# Patient Record
Sex: MALE | Race: BLACK OR AFRICAN AMERICAN | NOT HISPANIC OR LATINO | URBAN - METROPOLITAN AREA
[De-identification: names, ages, dates, MRNs, and addresses within clinical notes are randomized per-mention and may not be internally consistent; named-entity substitution may affect disease eponyms.]

---

## 2019-09-06 ENCOUNTER — EMERGENCY (EMERGENCY)
Facility: HOSPITAL | Age: 49
LOS: 1 days | Discharge: HOME | End: 2019-09-06
Attending: EMERGENCY MEDICINE | Admitting: EMERGENCY MEDICINE
Payer: COMMERCIAL

## 2019-09-06 VITALS
RESPIRATION RATE: 16 BRPM | SYSTOLIC BLOOD PRESSURE: 143 MMHG | OXYGEN SATURATION: 100 % | DIASTOLIC BLOOD PRESSURE: 84 MMHG | HEART RATE: 87 BPM

## 2019-09-06 VITALS
WEIGHT: 145.06 LBS | DIASTOLIC BLOOD PRESSURE: 100 MMHG | HEART RATE: 96 BPM | TEMPERATURE: 99 F | SYSTOLIC BLOOD PRESSURE: 168 MMHG | OXYGEN SATURATION: 100 % | RESPIRATION RATE: 16 BRPM

## 2019-09-06 LAB
ALBUMIN SERPL ELPH-MCNC: 4.1 G/DL — SIGNIFICANT CHANGE UP (ref 3.3–5)
ALP SERPL-CCNC: 71 U/L — SIGNIFICANT CHANGE UP (ref 40–120)
ALT FLD-CCNC: 32 U/L — SIGNIFICANT CHANGE UP (ref 12–78)
ANION GAP SERPL CALC-SCNC: 11 MMOL/L — SIGNIFICANT CHANGE UP (ref 5–17)
APPEARANCE UR: CLEAR — SIGNIFICANT CHANGE UP
AST SERPL-CCNC: 35 U/L — SIGNIFICANT CHANGE UP (ref 15–37)
BASOPHILS # BLD AUTO: 0.03 K/UL — SIGNIFICANT CHANGE UP (ref 0–0.2)
BASOPHILS NFR BLD AUTO: 0.7 % — SIGNIFICANT CHANGE UP (ref 0–2)
BILIRUB SERPL-MCNC: 0.4 MG/DL — SIGNIFICANT CHANGE UP (ref 0.2–1.2)
BILIRUB UR-MCNC: NEGATIVE — SIGNIFICANT CHANGE UP
BUN SERPL-MCNC: 12 MG/DL — SIGNIFICANT CHANGE UP (ref 7–23)
CALCIUM SERPL-MCNC: 9.2 MG/DL — SIGNIFICANT CHANGE UP (ref 8.5–10.1)
CHLORIDE SERPL-SCNC: 98 MMOL/L — SIGNIFICANT CHANGE UP (ref 96–108)
CK MB CFR SERPL CALC: 2.2 NG/ML — SIGNIFICANT CHANGE UP (ref 0–3.6)
CO2 SERPL-SCNC: 26 MMOL/L — SIGNIFICANT CHANGE UP (ref 22–31)
COLOR SPEC: SIGNIFICANT CHANGE UP
CREAT SERPL-MCNC: 1.1 MG/DL — SIGNIFICANT CHANGE UP (ref 0.5–1.3)
DIFF PNL FLD: NEGATIVE — SIGNIFICANT CHANGE UP
EOSINOPHIL # BLD AUTO: 0.08 K/UL — SIGNIFICANT CHANGE UP (ref 0–0.5)
EOSINOPHIL NFR BLD AUTO: 1.8 % — SIGNIFICANT CHANGE UP (ref 0–6)
GLUCOSE SERPL-MCNC: 84 MG/DL — SIGNIFICANT CHANGE UP (ref 70–99)
GLUCOSE UR QL: NEGATIVE — SIGNIFICANT CHANGE UP
HCT VFR BLD CALC: 37.2 % — LOW (ref 39–50)
HGB BLD-MCNC: 12 G/DL — LOW (ref 13–17)
IMM GRANULOCYTES NFR BLD AUTO: 0.2 % — SIGNIFICANT CHANGE UP (ref 0–1.5)
KETONES UR-MCNC: ABNORMAL
LEUKOCYTE ESTERASE UR-ACNC: NEGATIVE — SIGNIFICANT CHANGE UP
LYMPHOCYTES # BLD AUTO: 1.62 K/UL — SIGNIFICANT CHANGE UP (ref 1–3.3)
LYMPHOCYTES # BLD AUTO: 35.7 % — SIGNIFICANT CHANGE UP (ref 13–44)
MCHC RBC-ENTMCNC: 26.9 PG — LOW (ref 27–34)
MCHC RBC-ENTMCNC: 32.3 GM/DL — SIGNIFICANT CHANGE UP (ref 32–36)
MCV RBC AUTO: 83.4 FL — SIGNIFICANT CHANGE UP (ref 80–100)
MONOCYTES # BLD AUTO: 0.39 K/UL — SIGNIFICANT CHANGE UP (ref 0–0.9)
MONOCYTES NFR BLD AUTO: 8.6 % — SIGNIFICANT CHANGE UP (ref 2–14)
NEUTROPHILS # BLD AUTO: 2.41 K/UL — SIGNIFICANT CHANGE UP (ref 1.8–7.4)
NEUTROPHILS NFR BLD AUTO: 53 % — SIGNIFICANT CHANGE UP (ref 43–77)
NITRITE UR-MCNC: NEGATIVE — SIGNIFICANT CHANGE UP
NRBC # BLD: 0 /100 WBCS — SIGNIFICANT CHANGE UP (ref 0–0)
PH UR: 7 — SIGNIFICANT CHANGE UP (ref 5–8)
PLATELET # BLD AUTO: 216 K/UL — SIGNIFICANT CHANGE UP (ref 150–400)
POTASSIUM SERPL-MCNC: 3.7 MMOL/L — SIGNIFICANT CHANGE UP (ref 3.5–5.3)
POTASSIUM SERPL-SCNC: 3.7 MMOL/L — SIGNIFICANT CHANGE UP (ref 3.5–5.3)
PROT SERPL-MCNC: 7.7 G/DL — SIGNIFICANT CHANGE UP (ref 6–8.3)
PROT UR-MCNC: NEGATIVE — SIGNIFICANT CHANGE UP
RBC # BLD: 4.46 M/UL — SIGNIFICANT CHANGE UP (ref 4.2–5.8)
RBC # FLD: 13.2 % — SIGNIFICANT CHANGE UP (ref 10.3–14.5)
SODIUM SERPL-SCNC: 135 MMOL/L — SIGNIFICANT CHANGE UP (ref 135–145)
SP GR SPEC: 1 — LOW (ref 1.01–1.02)
TROPONIN I SERPL-MCNC: <.015 NG/ML — SIGNIFICANT CHANGE UP (ref 0.01–0.04)
UROBILINOGEN FLD QL: NEGATIVE — SIGNIFICANT CHANGE UP
WBC # BLD: 4.54 K/UL — SIGNIFICANT CHANGE UP (ref 3.8–10.5)
WBC # FLD AUTO: 4.54 K/UL — SIGNIFICANT CHANGE UP (ref 3.8–10.5)

## 2019-09-06 PROCEDURE — 80053 COMPREHEN METABOLIC PANEL: CPT

## 2019-09-06 PROCEDURE — 85027 COMPLETE CBC AUTOMATED: CPT

## 2019-09-06 PROCEDURE — 82553 CREATINE MB FRACTION: CPT

## 2019-09-06 PROCEDURE — 81003 URINALYSIS AUTO W/O SCOPE: CPT

## 2019-09-06 PROCEDURE — 99284 EMERGENCY DEPT VISIT MOD MDM: CPT | Mod: 25

## 2019-09-06 PROCEDURE — 96360 HYDRATION IV INFUSION INIT: CPT

## 2019-09-06 PROCEDURE — 71046 X-RAY EXAM CHEST 2 VIEWS: CPT

## 2019-09-06 PROCEDURE — 99284 EMERGENCY DEPT VISIT MOD MDM: CPT

## 2019-09-06 PROCEDURE — 84484 ASSAY OF TROPONIN QUANT: CPT

## 2019-09-06 PROCEDURE — 93005 ELECTROCARDIOGRAM TRACING: CPT

## 2019-09-06 PROCEDURE — 36415 COLL VENOUS BLD VENIPUNCTURE: CPT

## 2019-09-06 PROCEDURE — 71046 X-RAY EXAM CHEST 2 VIEWS: CPT | Mod: 26

## 2019-09-06 PROCEDURE — 93010 ELECTROCARDIOGRAM REPORT: CPT

## 2019-09-06 RX ORDER — NIFEDIPINE 30 MG
60 TABLET, EXTENDED RELEASE 24 HR ORAL ONCE
Refills: 0 | Status: COMPLETED | OUTPATIENT
Start: 2019-09-06 | End: 2019-09-06

## 2019-09-06 RX ORDER — HYDROCHLOROTHIAZIDE 25 MG
25 TABLET ORAL ONCE
Refills: 0 | Status: COMPLETED | OUTPATIENT
Start: 2019-09-06 | End: 2019-09-06

## 2019-09-06 RX ORDER — SODIUM CHLORIDE 9 MG/ML
1000 INJECTION INTRAMUSCULAR; INTRAVENOUS; SUBCUTANEOUS ONCE
Refills: 0 | Status: COMPLETED | OUTPATIENT
Start: 2019-09-06 | End: 2019-09-06

## 2019-09-06 RX ADMIN — SODIUM CHLORIDE 1000 MILLILITER(S): 9 INJECTION INTRAMUSCULAR; INTRAVENOUS; SUBCUTANEOUS at 15:15

## 2019-09-06 RX ADMIN — Medication 25 MILLIGRAM(S): at 15:19

## 2019-09-06 RX ADMIN — SODIUM CHLORIDE 1000 MILLILITER(S): 9 INJECTION INTRAMUSCULAR; INTRAVENOUS; SUBCUTANEOUS at 16:15

## 2019-09-06 RX ADMIN — Medication 60 MILLIGRAM(S): at 15:19

## 2019-09-06 NOTE — ED PROVIDER NOTE - PROGRESS NOTE DETAILS
patient feeling better, able to ambulate normal gait, blood pressure improved, understands to be compliant with meds, will f/u with own PMD next week

## 2019-09-06 NOTE — ED PROVIDER NOTE - CLINICAL SUMMARY MEDICAL DECISION MAKING FREE TEXT BOX
chills, trembling, elevated blood pressure, did not take his meds, f/u labs, ekg, chest xray, give anti-hypertensive, re-eval

## 2019-09-06 NOTE — ED PROVIDER NOTE - OBJECTIVE STATEMENT
49 male presents to ER states he was driving to work today and felt chills, tembling of bilateral hand and legs, denies chest pain. Patient states he forgot to take his blood pressure medications this morning (on nifedinpine, hctz, atenolol).

## 2019-09-06 NOTE — ED ADULT NURSE NOTE - OBJECTIVE STATEMENT
received pt in bed #18a Pt A&O states he was driving & started trembling received pt in bed #18a Pt A&O states he was driving & started trembling & states his arms & legs are weak

## 2019-09-06 NOTE — ED PROVIDER NOTE - PATIENT PORTAL LINK FT
You can access the FollowMyHealth Patient Portal offered by [facility] by registering at the following website: [email or web address]. By joining [facility]’s FollowMyHealth portal, you will also be able to view your health information using other applications (apps) compatible with our system.

## 2019-09-06 NOTE — ED ADULT NURSE NOTE - TEMPLATE LIST FOR HEAD TO TOE ASSESSMENT
"Subjective:       Patient ID: Fariba Polk is a 37 y.o. female.    Vitals:  height is 5' 4" (1.626 m) and weight is 61.7 kg (136 lb). Her temperature is 98 °F (36.7 °C). Her blood pressure is 114/64 and her pulse is 79. Her respiration is 18 and oxygen saturation is 98%.     Chief Complaint: Mass    Pt states she noticed an area of swelling to left lower ribs/left upper abdominal region a couple weeks ago that has now enlarged, become more painful and pt reports losing weight. She has been working extra hours "a lot" at work and "ignoring it but now its bigger and more painful".  Pt states she was hit by a large pt in this area while working a few weeks ago but did not notice anything particular at that time. Hx choly earlier this year. Pt has dropped 40 pounds since Jan 2019, 6 pounds have been since noticing this left abdominal area of pain.    Mass   This is a new problem. The current episode started 1 to 4 weeks ago. The problem has been gradually worsening. Associated symptoms include abdominal pain (caused by a mass ). Pertinent negatives include no arthralgias, change in bowel habit, chest pain, chills, diaphoresis, fatigue, fever, headaches, joint swelling, myalgias, nausea, neck pain, numbness, rash, sore throat, swollen glands, urinary symptoms, vomiting or weakness. Exacerbated by: touching it. She has tried position changes for the symptoms. The treatment provided no relief.       Constitution: Positive for unexpected weight change. Negative for appetite change, chills, sweating, fatigue and fever.   HENT: Negative for sore throat and trouble swallowing.    Neck: Negative for neck pain, neck stiffness and painful lymph nodes.   Cardiovascular: Negative for chest pain and leg swelling.   Respiratory: Negative for shortness of breath.    Gastrointestinal: Positive for abdominal pain (caused by a mass ). Negative for abdominal trauma, abdominal bloating, history of abdominal surgery, nausea, vomiting, " constipation, diarrhea, bright red blood in stool, dark colored stools and heartburn.   Genitourinary: Negative for dysuria, frequency, urgency, flank pain, hematuria, history of kidney stones, missed menses and pelvic pain.   Musculoskeletal: Negative for joint pain, joint swelling, back pain and muscle ache.   Skin: Negative for pale, rash, abrasion and erythema.   Neurological: Negative for headaches, numbness and tingling.   Hematologic/Lymphatic: Negative for swollen lymph nodes and easy bruising/bleeding. Does not bruise/bleed easily.       Objective:      Physical Exam   Constitutional: She is oriented to person, place, and time. She appears well-developed and well-nourished. She is cooperative. No distress.   HENT:   Head: Normocephalic and atraumatic.   Right Ear: External ear normal.   Left Ear: External ear normal.   Nose: Nose normal.   Mouth/Throat: Mucous membranes are normal.   Eyes: Conjunctivae and lids are normal. No scleral icterus.   Neck: Trachea normal, normal range of motion and full passive range of motion without pain. Neck supple.   Cardiovascular: Normal rate, regular rhythm, normal heart sounds and intact distal pulses.   No murmur heard.  Pulmonary/Chest: Effort normal and breath sounds normal. No respiratory distress. She has no wheezes. She exhibits no tenderness.   Abdominal: Soft. Normal appearance and bowel sounds are normal. She exhibits mass. She exhibits no distension, no abdominal bruit and no pulsatile midline mass. There is tenderness in the left upper quadrant. There is no rigidity, no rebound, no guarding, no CVA tenderness, no tenderness at McBurney's point and negative Mckeon's sign.       Musculoskeletal: Normal range of motion. She exhibits no edema.   Neurological: She is alert and oriented to person, place, and time. She has normal strength. Coordination normal.   Skin: Skin is warm, dry and intact. Capillary refill takes less than 2 seconds. No rash noted. She is not  diaphoretic. No erythema. No pallor.   Psychiatric: She has a normal mood and affect. Her speech is normal and behavior is normal. Judgment and thought content normal. Cognition and memory are normal.   Nursing note and vitals reviewed.      Assessment:       1. Abdominal pain, LUQ (left upper quadrant)        Plan:     Pt with luq pain, 2-3 cm soft mass felt to left lower rib line upper left abdominal area. No distension. No guarding. Normal BS. Advised pt on f/u at ED, states she will go after work tonight when gets off at 1115. Explained to pt I would give her a work excuse and this should be completed as soon as possible but states she needs to go to work. Discussed risks with pt who verbalizes she will still go after work.     Abdominal pain, LUQ (left upper quadrant)  -     POCT urine pregnancy  -     XR KUB; Future; Expected date: 08/01/2019      Office Visit on 08/01/2019   Component Date Value Ref Range Status    POC Preg Test, Ur 08/01/2019 Negative  Negative Final     Acceptable 08/01/2019 Yes   Final     Xr Kub    Result Date: 8/1/2019  EXAMINATION: XR KUB CLINICAL HISTORY: luq pain, palpable soft tissue mass;Left upper quadrant pain TECHNIQUE: Single AP supine view of the abdomen (KUB) was performed COMPARISON: None FINDINGS: Postoperative changes identified in the right upper quadrant of the abdomen.  No significant bowel dilatation identified.  No definite free air in the abdomen.  No significant intra-abdominal calcifications.     See above Electronically signed by: Bakari Rosado MD Date:    08/01/2019 Time:    11:05        Patient Instructions       KUB completed with no acute findings. This needs to be followed up by your family provider for further evaluation including consideration of advanced imaging or referral to specialist.  If pain worsening, you vomit black or bloody emesis, have black of bloody stool, start with fevers, feel like area is further enlarging, or experience any  other concerning or worsening symptoms, it is advised you go to the nearest ED for further evaluation.     Abdominal Pain    Abdominal pain is pain in the stomach or belly area. Everyone has this pain from time to time. In many cases it goes away on its own. But abdominal pain can sometimes be due to a serious problem, such as appendicitis. So its important to know when to seek help.  Causes of abdominal pain  There are many possible causes of abdominal pain. Common causes in adults include:  Constipation, diarrhea, or gas  Stomach acid flowing back up into the esophagus (acid reflux or heartburn)  Severe acid reflux, called GERD (gastroesophageal reflux disease)  A sore in the lining of the stomach or small intestine (peptic ulcer)  Inflammation of the gallbladder, liver, or pancreas  Gallstones or kidney stones  Appendicitis   Intestinal blockage   An internal organ pushing through a muscle or other tissue (hernia)  Urinary tract infections  In women, menstrual cramps, fibroids, or endometriosis  Inflammation or infection of the intestines  Diagnosing the cause of abdominal pain  Your healthcare provider will do a physical exam help find the cause of your pain. If needed, tests will be ordered. Belly pain has many possible causes. So it can be hard to find the reason for your pain. Giving details about your pain can help. Tell your provider where and when you feel the pain, and what makes it better or worse. Also let your provider know if you have other symptoms such as:  Fever  Tiredness  Upset stomach (nausea)  Vomiting  Changes in bathroom habits  Treating abdominal pain  Some causes of pain need emergency medical treatment right away. These include appendicitis or a bowel blockage. Other problems can be treated with rest, fluids, or medicines. Your healthcare provider can give you specific instructions for treatment or self-care based on what is causing your pain.  If you have vomiting or diarrhea, sip water  or other clear fluids. When you are ready to eat solid foods again, start with small amounts of easy-to-digest, low-fat foods. These include apple sauce, toast, or crackers.   When to seek medical care  Call 911 or go to the hospital right away if you:  Cant pass stool and are vomiting  Are vomiting blood or have bloody diarrhea or black, tarry diarrhea  Have chest, neck, or shoulder pain  Feel like you might pass out  Have pain in your shoulder blades with nausea  Have sudden, severe belly pain  Have new, severe pain unlike any you have felt before  Have a belly that is rigid, hard, and tender to touch  Call your healthcare provider if you have:  Pain for more than 5 days  Bloating for more than 2 days  Diarrhea for more than 5 days  A fever of 100.4°F (38.0°C) or higher, or as directed by your provider  Pain that gets worse  Weight loss for no reason  Continued lack of appetite  Blood in your stool  How to prevent abdominal pain  Here are some tips to help prevent abdominal pain:  Eat smaller amounts of food at one time.  Avoid greasy, fried, or other high-fat foods.  Avoid foods that give you gas.  Exercise regularly.  Drink plenty of fluids.  To help prevent GERD symptoms:  Quit smoking.  Reduce alcohol and certain foods that increase stomach acid.  Avoid aspirin and over-the-counter pain and fever medicines (NSAIDS or nonsteroidal anti-inflammatory drugs), if possible  Lose extra weight.  Finish eating at least 2 hours before you go to bed or lie down.  Raise the head of your bed.  Date Last Reviewed: 7/1/2016  © [id number] [employer]. [address] [place] [address], [place] [address]. All rights reserved. This information is not intended as a substitute for professional medical care. Always follow your healthcare professional's instructions.      ·   ·   · Follow up with your primary care in 2-5 days if symptoms have not improved, or you may return here.  · If you were referred to a specialist, please  follow up with that specialty.  · If you were prescribed antibiotics, please take them to completion.  · If you were prescribed a narcotic or any medication with sedative effects, do not drive or operate heavy equipment or machinery while taking these medications.  · You must understand that you have received treatment at an Urgent Care facility only, and that you may be released before all of your medical problems are known or treated. Urgent Care facilities are not equipped to handle life threatening emergencies. It is recommended that you go to an Emergency Department for further evaluation of worsening or concerning symptoms, or possibly life threatening conditions as discussed.                                        If you  smoke, please stop smoking             General

## 2023-03-14 ENCOUNTER — OFFICE (OUTPATIENT)
Facility: LOCATION | Age: 53
Setting detail: OPHTHALMOLOGY
End: 2023-03-14
Payer: MEDICAID

## 2023-03-14 VITALS — HEIGHT: 64 IN | BODY MASS INDEX: 27.14 KG/M2 | WEIGHT: 159 LBS

## 2023-03-14 DIAGNOSIS — H11.043: ICD-10-CM

## 2023-03-14 DIAGNOSIS — H40.063: ICD-10-CM

## 2023-03-14 PROCEDURE — 92133 CPTRZD OPH DX IMG PST SGM ON: CPT | Performed by: OPHTHALMOLOGY

## 2023-03-14 PROCEDURE — 76514 ECHO EXAM OF EYE THICKNESS: CPT | Performed by: OPHTHALMOLOGY

## 2023-03-14 PROCEDURE — 92004 COMPRE OPH EXAM NEW PT 1/>: CPT | Performed by: OPHTHALMOLOGY

## 2023-03-14 PROCEDURE — 92083 EXTENDED VISUAL FIELD XM: CPT | Performed by: OPHTHALMOLOGY

## 2023-03-14 PROCEDURE — 92020 GONIOSCOPY: CPT | Performed by: OPHTHALMOLOGY

## 2023-03-14 ASSESSMENT — VISUAL ACUITY
OD_BCVA: 20/30
OS_BCVA: 20/25+1

## 2023-03-14 ASSESSMENT — TONOMETRY
OD_IOP_MMHG: 16
OS_IOP_MMHG: 18

## 2023-03-14 ASSESSMENT — PACHYMETRY
OD_CT_CORRECTION: 3
OS_CT_CORRECTION: 3
OS_CT_UM: 502
OD_CT_UM: 505

## 2023-03-14 ASSESSMENT — REFRACTION_AUTOREFRACTION
OS_SPHERE: +2.00
OD_SPHERE: +1.50
OS_CYLINDER: -0.75
OS_AXIS: 049
OD_AXIS: 069
OD_CYLINDER: -0.25

## 2023-03-14 ASSESSMENT — REFRACTION_CURRENTRX
OS_OVR_VA: 20/
OD_OVR_VA: 20/
OD_CYLINDER: -0.50
OS_AXIS: 082
OS_CYLINDER: -0.50
OS_SPHERE: +2.75
OD_AXIS: 092
OD_SPHERE: +2.75

## 2023-03-14 ASSESSMENT — AXIALLENGTH_DERIVED
OS_AL: 23.1067
OD_AL: 22.8077

## 2023-03-14 ASSESSMENT — KERATOMETRY
OD_AXISANGLE_DEGREES: 093
OS_AXISANGLE_DEGREES: 107
OS_K2POWER_DIOPTERS: 43.75
OD_K1POWER_DIOPTERS: 44.00
OS_K1POWER_DIOPTERS: 42.50
OD_K2POWER_DIOPTERS: 44.50

## 2023-03-14 ASSESSMENT — CORNEAL PTERYGIUM
OS_PTERYGIUM: NASAL 3MM
OD_PTERYGIUM: NASAL 1MM

## 2023-03-14 ASSESSMENT — SPHEQUIV_DERIVED
OD_SPHEQUIV: 1.375
OS_SPHEQUIV: 1.625

## 2023-03-14 ASSESSMENT — CONFRONTATIONAL VISUAL FIELD TEST (CVF)
OD_FINDINGS: FULL
OS_FINDINGS: FULL

## 2023-03-20 NOTE — ED ADULT NURSE NOTE - CHPI ED NUR SYMPTOMS NEG
Continue home medications, reviewed on admission     no fever/no pain/no nausea/no dizziness/no tingling/no chills/no decreased eating/drinking/no vomiting

## 2023-04-14 ENCOUNTER — RX ONLY (RX ONLY)
Age: 53
End: 2023-04-14

## 2023-04-14 ENCOUNTER — OFFICE (OUTPATIENT)
Facility: LOCATION | Age: 53
Setting detail: OPHTHALMOLOGY
End: 2023-04-14
Payer: MEDICAID

## 2023-04-14 DIAGNOSIS — H40.062: ICD-10-CM

## 2023-04-14 PROBLEM — H40.061 PRIMARY ANGLE CLOSURE WITHOUT DAMAGE; RIGHT EYE, LEFT EYE, BOTH EYES: Status: ACTIVE | Noted: 2023-04-14

## 2023-04-14 PROBLEM — H40.063 PRIMARY ANGLE CLOSURE WITHOUT DAMAGE; RIGHT EYE, LEFT EYE, BOTH EYES: Status: ACTIVE | Noted: 2023-04-14

## 2023-04-14 PROCEDURE — 66761 REVISION OF IRIS: CPT | Performed by: OPHTHALMOLOGY

## 2023-04-14 ASSESSMENT — REFRACTION_AUTOREFRACTION
OD_AXIS: 080
OD_CYLINDER: -0.25
OD_SPHERE: +1.50
OS_SPHERE: +1.75
OS_CYLINDER: -0.75
OS_AXIS: 028

## 2023-04-14 ASSESSMENT — VISUAL ACUITY
OS_BCVA: 20/20-2
OD_BCVA: 20/40

## 2023-04-14 ASSESSMENT — PACHYMETRY
OD_CT_UM: 505
OS_CT_CORRECTION: 3
OS_CT_UM: 502
OD_CT_CORRECTION: 3

## 2023-04-14 ASSESSMENT — REFRACTION_CURRENTRX
OS_OVR_VA: 20/
OD_AXIS: 092
OS_AXIS: 082
OS_CYLINDER: -0.50
OS_SPHERE: +2.75
OD_OVR_VA: 20/
OD_SPHERE: +2.75
OD_CYLINDER: -0.50

## 2023-04-14 ASSESSMENT — CORNEAL PTERYGIUM
OD_PTERYGIUM: NASAL 1MM
OS_PTERYGIUM: NASAL 3MM

## 2023-04-14 ASSESSMENT — CONFRONTATIONAL VISUAL FIELD TEST (CVF)
OD_FINDINGS: FULL
OS_FINDINGS: FULL

## 2023-04-14 ASSESSMENT — KERATOMETRY
OD_AXISANGLE_DEGREES: 091
OS_K1POWER_DIOPTERS: 42.50
OS_K2POWER_DIOPTERS: 43.75
OS_AXISANGLE_DEGREES: 104
OD_K2POWER_DIOPTERS: 44.75
OD_K1POWER_DIOPTERS: 44.25

## 2023-04-14 ASSESSMENT — TONOMETRY
OD_IOP_MMHG: 18
OS_IOP_MMHG: 18

## 2023-04-14 ASSESSMENT — AXIALLENGTH_DERIVED
OS_AL: 23.2005
OD_AL: 22.7222

## 2023-04-14 ASSESSMENT — SPHEQUIV_DERIVED
OD_SPHEQUIV: 1.375
OS_SPHEQUIV: 1.375

## 2023-05-16 ENCOUNTER — OFFICE (OUTPATIENT)
Facility: LOCATION | Age: 53
Setting detail: OPHTHALMOLOGY
End: 2023-05-16
Payer: MEDICAID

## 2023-05-16 VITALS — HEIGHT: 64 IN | BODY MASS INDEX: 27.14 KG/M2 | WEIGHT: 159 LBS

## 2023-05-16 DIAGNOSIS — H40.061: ICD-10-CM

## 2023-05-16 PROBLEM — H25.13 CATARACT SENILE NUCLEAR SCLEROSIS; BOTH EYES: Status: ACTIVE | Noted: 2023-05-16

## 2023-05-16 PROCEDURE — 66761 REVISION OF IRIS: CPT | Performed by: OPHTHALMOLOGY

## 2023-05-16 ASSESSMENT — CORNEAL PTERYGIUM
OS_PTERYGIUM: NASAL 3MM
OD_PTERYGIUM: NASAL 1MM

## 2023-05-16 ASSESSMENT — REFRACTION_AUTOREFRACTION
OD_SPHERE: +1.00
OS_CYLINDER: -0.75
OS_SPHERE: +1.75
OS_AXIS: 32
OD_CYLINDER: SPHERE

## 2023-05-16 ASSESSMENT — TONOMETRY
OS_IOP_MMHG: 15
OD_IOP_MMHG: 14

## 2023-05-16 ASSESSMENT — PACHYMETRY
OS_CT_UM: 502
OD_CT_CORRECTION: 3
OS_CT_CORRECTION: 3
OD_CT_UM: 505

## 2023-05-16 ASSESSMENT — AXIALLENGTH_DERIVED: OS_AL: 23.2005

## 2023-05-16 ASSESSMENT — REFRACTION_CURRENTRX
OS_CYLINDER: -0.50
OS_SPHERE: +2.75
OD_SPHERE: +2.75
OD_OVR_VA: 20/
OS_AXIS: 082
OS_OVR_VA: 20/
OD_AXIS: 092
OD_CYLINDER: -0.50

## 2023-05-16 ASSESSMENT — VISUAL ACUITY
OD_BCVA: 20/25
OS_BCVA: 20/25

## 2023-05-16 ASSESSMENT — KERATOMETRY
OS_K1POWER_DIOPTERS: 42.50
OS_K2POWER_DIOPTERS: 43.75
OD_K2POWER_DIOPTERS: 44.75
OD_AXISANGLE_DEGREES: 091
OD_K1POWER_DIOPTERS: 44.25
OS_AXISANGLE_DEGREES: 107

## 2023-05-16 ASSESSMENT — SPHEQUIV_DERIVED: OS_SPHEQUIV: 1.375

## 2023-06-27 ENCOUNTER — OFFICE (OUTPATIENT)
Facility: LOCATION | Age: 53
Setting detail: OPHTHALMOLOGY
End: 2023-06-27
Payer: MEDICAID

## 2023-06-27 DIAGNOSIS — H40.063: ICD-10-CM

## 2023-06-27 DIAGNOSIS — H11.043: ICD-10-CM

## 2023-06-27 DIAGNOSIS — H25.13: ICD-10-CM

## 2023-06-27 DIAGNOSIS — H40.061: ICD-10-CM

## 2023-06-27 DIAGNOSIS — H40.062: ICD-10-CM

## 2023-06-27 PROCEDURE — 92012 INTRM OPH EXAM EST PATIENT: CPT | Performed by: OPHTHALMOLOGY

## 2023-06-27 ASSESSMENT — TONOMETRY
OS_IOP_MMHG: 16
OD_IOP_MMHG: 16

## 2023-06-27 ASSESSMENT — SPHEQUIV_DERIVED
OS_SPHEQUIV: 1.875
OD_SPHEQUIV: 1.75

## 2023-06-27 ASSESSMENT — KERATOMETRY
OD_K1POWER_DIOPTERS: 44.50
OS_K2POWER_DIOPTERS: 43.50
OD_AXISANGLE_DEGREES: 115
OS_K1POWER_DIOPTERS: 42.25
OD_K2POWER_DIOPTERS: 44.00
OS_AXISANGLE_DEGREES: 114

## 2023-06-27 ASSESSMENT — REFRACTION_CURRENTRX
OD_SPHERE: +2.75
OD_AXIS: 092
OS_SPHERE: +2.75
OS_OVR_VA: 20/
OD_OVR_VA: 20/
OS_AXIS: 082
OD_CYLINDER: -0.50
OS_CYLINDER: -0.50

## 2023-06-27 ASSESSMENT — REFRACTION_AUTOREFRACTION
OS_SPHERE: +2.25
OS_AXIS: 049
OD_AXIS: 095
OS_CYLINDER: -0.75
OD_CYLINDER: -0.50
OD_SPHERE: +2.00

## 2023-06-27 ASSESSMENT — VISUAL ACUITY
OD_BCVA: 20/25
OS_BCVA: 20/30-

## 2023-06-27 ASSESSMENT — AXIALLENGTH_DERIVED
OD_AL: 22.6719
OS_AL: 23.1014

## 2023-06-27 ASSESSMENT — CONFRONTATIONAL VISUAL FIELD TEST (CVF)
OD_FINDINGS: FULL
OS_FINDINGS: FULL

## 2023-06-27 ASSESSMENT — PACHYMETRY
OD_CT_UM: 505
OS_CT_CORRECTION: 3
OD_CT_CORRECTION: 3
OS_CT_UM: 502

## 2023-06-27 ASSESSMENT — CORNEAL PTERYGIUM
OS_PTERYGIUM: NASAL 3MM
OD_PTERYGIUM: NASAL 1MM

## 2023-11-20 NOTE — ED ADULT NURSE NOTE - PAIN: PRESENCE, MLM
denies pain/discomfort Azathioprine Pregnancy And Lactation Text: This medication is Pregnancy Category D and isn't considered safe during pregnancy. It is unknown if this medication is excreted in breast milk.

## 2023-12-19 ENCOUNTER — OFFICE (OUTPATIENT)
Facility: LOCATION | Age: 53
Setting detail: OPHTHALMOLOGY
End: 2023-12-19
Payer: MEDICAID

## 2023-12-19 DIAGNOSIS — H40.063: ICD-10-CM

## 2023-12-19 DIAGNOSIS — H40.062: ICD-10-CM

## 2023-12-19 DIAGNOSIS — H40.061: ICD-10-CM

## 2023-12-19 DIAGNOSIS — H11.043: ICD-10-CM

## 2023-12-19 DIAGNOSIS — H25.13: ICD-10-CM

## 2023-12-19 PROCEDURE — 92012 INTRM OPH EXAM EST PATIENT: CPT | Performed by: OPHTHALMOLOGY

## 2023-12-19 ASSESSMENT — REFRACTION_CURRENTRX
OS_AXIS: 082
OS_SPHERE: +2.75
OD_OVR_VA: 20/
OD_AXIS: 092
OS_OVR_VA: 20/
OD_SPHERE: +2.75
OD_CYLINDER: -0.50
OS_CYLINDER: -0.50

## 2023-12-19 ASSESSMENT — REFRACTION_AUTOREFRACTION
OS_CYLINDER: -0.75
OD_SPHERE: +2.00
OS_AXIS: 052
OD_AXIS: 098
OS_SPHERE: +2.00
OD_CYLINDER: -0.75

## 2023-12-19 ASSESSMENT — CONFRONTATIONAL VISUAL FIELD TEST (CVF)
OD_FINDINGS: FULL
OS_FINDINGS: FULL

## 2023-12-19 ASSESSMENT — CORNEAL PTERYGIUM
OS_PTERYGIUM: NASAL 3MM
OD_PTERYGIUM: NASAL 1MM

## 2023-12-19 ASSESSMENT — SPHEQUIV_DERIVED
OD_SPHEQUIV: 1.625
OS_SPHEQUIV: 1.625

## 2024-06-28 ENCOUNTER — OFFICE (OUTPATIENT)
Facility: LOCATION | Age: 54
Setting detail: OPHTHALMOLOGY
End: 2024-06-28
Payer: MEDICAID

## 2024-06-28 DIAGNOSIS — H11.043: ICD-10-CM

## 2024-06-28 DIAGNOSIS — H25.13: ICD-10-CM

## 2024-06-28 DIAGNOSIS — H40.063: ICD-10-CM

## 2024-06-28 PROCEDURE — 92083 EXTENDED VISUAL FIELD XM: CPT | Performed by: OPHTHALMOLOGY

## 2024-06-28 PROCEDURE — 92133 CPTRZD OPH DX IMG PST SGM ON: CPT | Performed by: OPHTHALMOLOGY

## 2024-06-28 PROCEDURE — 92014 COMPRE OPH EXAM EST PT 1/>: CPT | Performed by: OPHTHALMOLOGY

## 2024-06-28 ASSESSMENT — CONFRONTATIONAL VISUAL FIELD TEST (CVF)
OD_FINDINGS: FULL
OS_FINDINGS: FULL

## 2024-12-27 ENCOUNTER — OFFICE (OUTPATIENT)
Facility: LOCATION | Age: 54
Setting detail: OPHTHALMOLOGY
End: 2024-12-27
Payer: MEDICAID

## 2024-12-27 DIAGNOSIS — H52.4: ICD-10-CM

## 2024-12-27 DIAGNOSIS — H25.13: ICD-10-CM

## 2024-12-27 DIAGNOSIS — H40.063: ICD-10-CM

## 2024-12-27 DIAGNOSIS — H11.043: ICD-10-CM

## 2024-12-27 PROCEDURE — 99213 OFFICE O/P EST LOW 20 MIN: CPT | Performed by: OPHTHALMOLOGY

## 2024-12-27 PROCEDURE — 92015 DETERMINE REFRACTIVE STATE: CPT | Performed by: OPHTHALMOLOGY

## 2024-12-27 ASSESSMENT — VISUAL ACUITY
OD_BCVA: 20/60+2
OS_BCVA: 20/30

## 2024-12-27 ASSESSMENT — REFRACTION_MANIFEST
OD_ADD: +2.00
OS_AXIS: 045
OS_SPHERE: +2.25
OS_CYLINDER: -0.75
OD_AXIS: 095
OD_SPHERE: +1.75
OS_ADD: +2.00
OD_VA1: 20/20-2
OD_CYLINDER: -0.50
OS_VA1: 20/20-2

## 2024-12-27 ASSESSMENT — REFRACTION_CURRENTRX
OS_CYLINDER: -0.50
OD_SPHERE: +2.75
OS_OVR_VA: 20/
OS_SPHERE: +2.75
OD_CYLINDER: -0.50
OD_OVR_VA: 20/
OD_AXIS: 092
OS_AXIS: 082

## 2024-12-27 ASSESSMENT — TONOMETRY
OD_IOP_MMHG: 13
OS_IOP_MMHG: 13

## 2024-12-27 ASSESSMENT — REFRACTION_AUTOREFRACTION
OS_CYLINDER: -0.75
OD_SPHERE: +1.75
OS_SPHERE: +2.25
OS_AXIS: 045
OD_CYLINDER: -0.50
OD_AXIS: 095

## 2024-12-27 ASSESSMENT — PACHYMETRY
OD_CT_CORRECTION: 3
OS_CT_CORRECTION: 3
OD_CT_UM: 505
OS_CT_UM: 502

## 2024-12-27 ASSESSMENT — KERATOMETRY
OD_K2POWER_DIOPTERS: 43.75
OS_AXISANGLE_DEGREES: 120
OD_K1POWER_DIOPTERS: 44.50
OS_K1POWER_DIOPTERS: 42.00
OD_AXISANGLE_DEGREES: 115
OS_K2POWER_DIOPTERS: 43.00

## 2024-12-27 ASSESSMENT — CORNEAL PTERYGIUM
OS_PTERYGIUM: NASAL 3MM
OD_PTERYGIUM: NASAL 1MM

## 2024-12-27 ASSESSMENT — CONFRONTATIONAL VISUAL FIELD TEST (CVF)
OS_FINDINGS: FULL
OD_FINDINGS: FULL

## 2025-07-16 ENCOUNTER — OFFICE (OUTPATIENT)
Facility: LOCATION | Age: 55
Setting detail: OPHTHALMOLOGY
End: 2025-07-16
Payer: MEDICAID

## 2025-07-16 DIAGNOSIS — H40.063: ICD-10-CM

## 2025-07-16 DIAGNOSIS — H11.043: ICD-10-CM

## 2025-07-16 DIAGNOSIS — H25.13: ICD-10-CM

## 2025-07-16 PROCEDURE — 99213 OFFICE O/P EST LOW 20 MIN: CPT | Performed by: OPHTHALMOLOGY

## 2025-07-16 PROCEDURE — 92133 CPTRZD OPH DX IMG PST SGM ON: CPT | Performed by: OPHTHALMOLOGY

## 2025-07-16 PROCEDURE — 92083 EXTENDED VISUAL FIELD XM: CPT | Performed by: OPHTHALMOLOGY

## 2025-07-16 ASSESSMENT — REFRACTION_MANIFEST
OS_AXIS: 045
OS_VA1: 20/20-2
OS_CYLINDER: -0.75
OD_CYLINDER: -0.50
OD_VA1: 20/20-2
OD_ADD: +2.00
OS_SPHERE: +2.25
OS_ADD: +2.00
OD_AXIS: 095
OD_SPHERE: +1.75

## 2025-07-16 ASSESSMENT — REFRACTION_AUTOREFRACTION
OS_SPHERE: +3.25
OS_AXIS: 026
OD_SPHERE: +1.75
OD_AXIS: 092
OS_CYLINDER: -1.75
OD_CYLINDER: -0.75

## 2025-07-16 ASSESSMENT — REFRACTION_CURRENTRX
OS_OVR_VA: 20/
OD_AXIS: 092
OD_SPHERE: +2.75
OS_SPHERE: +2.75
OD_OVR_VA: 20/
OS_AXIS: 082
OD_CYLINDER: -0.50
OS_CYLINDER: -0.50

## 2025-07-16 ASSESSMENT — VISUAL ACUITY
OS_BCVA: 20/50-2
OD_BCVA: 20/50-1

## 2025-07-16 ASSESSMENT — CONFRONTATIONAL VISUAL FIELD TEST (CVF)
OS_FINDINGS: FULL
OD_FINDINGS: FULL

## 2025-07-16 ASSESSMENT — PACHYMETRY
OD_CT_UM: 505
OD_CT_CORRECTION: 3
OS_CT_UM: 502
OS_CT_CORRECTION: 3

## 2025-07-16 ASSESSMENT — KERATOMETRY
OS_K2POWER_DIOPTERS: 43.00
OD_K2POWER_DIOPTERS: 44.75
OD_AXISANGLE_DEGREES: 023
OS_AXISANGLE_DEGREES: 120
OS_K1POWER_DIOPTERS: UNABLE
OD_K1POWER_DIOPTERS: 44.50

## 2025-07-16 ASSESSMENT — TONOMETRY
OS_IOP_MMHG: 15
OD_IOP_MMHG: 15

## 2025-07-16 ASSESSMENT — CORNEAL PTERYGIUM
OS_PTERYGIUM: NASAL 3MM
OD_PTERYGIUM: NASAL 1MM